# Patient Record
Sex: MALE | Race: WHITE | URBAN - METROPOLITAN AREA
[De-identification: names, ages, dates, MRNs, and addresses within clinical notes are randomized per-mention and may not be internally consistent; named-entity substitution may affect disease eponyms.]

---

## 2017-03-10 ENCOUNTER — OFFICE VISIT (OUTPATIENT)
Dept: FAMILY MEDICINE CLINIC | Age: 47
End: 2017-03-10

## 2017-03-10 VITALS
HEART RATE: 107 BPM | TEMPERATURE: 99.1 F | SYSTOLIC BLOOD PRESSURE: 111 MMHG | BODY MASS INDEX: 30.07 KG/M2 | OXYGEN SATURATION: 96 % | HEIGHT: 69 IN | DIASTOLIC BLOOD PRESSURE: 70 MMHG | WEIGHT: 203 LBS

## 2017-03-10 DIAGNOSIS — J18.9 CAP (COMMUNITY ACQUIRED PNEUMONIA): ICD-10-CM

## 2017-03-10 DIAGNOSIS — J31.0 RHINITIS, UNSPECIFIED TYPE: ICD-10-CM

## 2017-03-10 DIAGNOSIS — R06.02 SHORTNESS OF BREATH: Primary | ICD-10-CM

## 2017-03-10 RX ORDER — FLUTICASONE PROPIONATE 50 MCG
SPRAY, SUSPENSION (ML) NASAL
Qty: 1 BOTTLE | Refills: 1 | Status: SHIPPED | OUTPATIENT
Start: 2017-03-10

## 2017-03-10 RX ORDER — LORATADINE 10 MG/1
10 TABLET ORAL DAILY
Qty: 30 TAB | Refills: 1 | Status: SHIPPED | OUTPATIENT
Start: 2017-03-10

## 2017-03-10 RX ORDER — ALBUTEROL SULFATE 0.83 MG/ML
2.5 SOLUTION RESPIRATORY (INHALATION) ONCE
Qty: 1 EACH | Refills: 2
Start: 2017-03-10 | End: 2017-03-10

## 2017-03-10 RX ORDER — AZITHROMYCIN 250 MG/1
TABLET, FILM COATED ORAL
Qty: 6 TAB | Refills: 0 | Status: SHIPPED | OUTPATIENT
Start: 2017-03-10 | End: 2017-03-15

## 2017-03-10 NOTE — PROGRESS NOTES
Per provider order, the patient was given 3 albuterol Neb treatments. The first began at 10:36 and the HR was 98, irregular, and RA O2 sat 99%. Lung sounds diminished throughout all fields. The second dose began at 10:46 and the HR was 112, irregular, and RA O2 sat 98%. Lung sounds mildly improved in the upper lobes bilaterally. The third dose began at 10:55 and the HR was 115, irregular, and the RA O2 sat was 99%. The provider assessed the patient after the first and third doses. With his family member and [place] as our , we reviewed his AVS, 4 prescriptions, Good Rx coupon card, and the pharmacy location. They expressed understanding and will go now to registration to schedule a provider follow-up appointment for Monday, 03-13-17. They understand that the patient should seek emergency care if his condition worsens. We also discussed the importance of drinking plenty of fluids, rest, and changing positions regularly while resting as importance steps for his recovery.  Lucy Sifuentes RN

## 2017-03-10 NOTE — PATIENT INSTRUCTIONS
Neumonía: Instrucciones de cuidado - [ Pneumonia: Care Instructions ]  Instrucciones de cuidado    La neumonía es luther infección de los pulmones. Nancy Polo de los casos son causados por infecciones debidas a bacterias o virus. La neumonía puede ser leve o muy grave. Si es causada por bacterias, será tratado con antibióticos. La recuperación completa de la neumonía podría merlyn [employer] o algunos meses, dependiendo de qué tan enfermo estuvo y si bowers estado general de lanette es alfaro. La atención de seguimiento es luther parte clave de bowers tratamiento y seguridad. Asegúrese de hacer y acudir a todas las citas, y llame a bowers médico si está teniendo problemas. También es luther buena idea saber los resultados de los exámenes y mantener luther lista de los medicamentos que arcelia. ¿Cómo puede cuidarse en el hogar? · [address] indicaciones. No deje de tomarlos por el hecho de sentirse mejor. Debe merlyn todos los antibióticos hasta terminarlos. · [employer] medicamentos exactamente nicole le fueron recetados. Llame a bowers médico si navya estar teniendo problemas con bowers medicamento. · Descanse y duerma lo suficiente. Podría sentirse cansado y débil ronnie un [address], radha bowers nivel de energía mejorará con [place]. · Para prevenir la deshidratación, jose c abundantes líquidos, los suficientes para que bowers orina sea de color amarillo grecia o marian nicole el agua. Elija agua y otros líquidos renea sin cafeína hasta que se sienta mejor. Si tiene luther enfermedad del riñón, del corazón o del hígado y tiene que Kiran's líquidos, hable con bowers médico antes de aumentar bowers consumo. · Trate la tos para que pueda descansar. La tos con mucosidad (flema) de los pulmones es común con la neumonía. Es luther de las [employer] que bowers organismo [address].  Radha si la tos le impide descansar o le causa gran fatiga y dolor en la pared torácica, hable con bowers médico. Podría sugerirle que tome un medicamento para aliviar la tos. · Utilice un vaporizador o humidificador para añadir humedad en bowers habitación. Siga las indicaciones para limpiar el aparato. · No fume ni permita que otras personas fumen cerca de usted. Fumar hará que la tos dure [employer]. Si necesita ayuda para dejar de fumar, hable con bowers médico [employer] y medicamentos para dejar de fumar. Éstos pueden aumentar ki probabilidades de dejar el hábito para siempre. · Annex un analgésico (medicamento para el dolor) de venta ilvia, nicole acetaminofén (Tylenol), ibuprofeno (Advil, Motrin) o naproxeno (Aleve). Alam Rosa y siga todas las instrucciones de la [place]. · No tome dos o más analgésicos al mismo tiempo a menos que el médico se lo haya indicado. Muchos analgésicos contienen acetaminofén, es decir, Tylenol. El exceso de acetaminofén (Tylenol) puede ser dañino. · Si le dieron un espirómetro para medir qué tan esperanza están funcionando ki pulmones, utilícelo nicole se lo indicaron. Volta puede ayudar a bowers médico a saber cómo va bowers recuperación. · Para prevenir la neumonía en el futuro, hable con bowers médico acerca de vacunarse contra la gripe (luther vez al año) y Prachi Cocking antineumocócica (sólo luther vez para la [address]). ¿Cuándo debe pedir ayuda? Llame al 911 en cualquier momento que considere que necesita atención de emergencia. Por ejemplo, llame si:  · [address] dificultades para respirar. Llame a bowers médico ahora mismo o busque atención médica inmediata si:  · Tose mucosidad (esputo) de color café oscuro o con deborah. · Tiene nueva o peor dificultad para respirar. · Siente mareos o aturdimiento, o que está a punto de [place]. Preste especial atención a los cambios en bowers lanette y asegúrese de comunicarse con bowers médico si:  · Presenta fiebre o la fiebre es más pratima. · Bowers tos es más profunda o más frecuente que antes. · No está mejorando después de 2 días (48 horas). · No mejora nicole se esperaba.   ¿Dónde puede encontrar más información en inglés? Soumya Bae a [email or web address]-lobito.[email or web address] Codey Parra X463 en la búsqueda para aprender más acerca de \"Neumonía: Instrucciones de cuidado - [ Pneumonia: Care Instructions ]. \"  Revisado: [address] [place], 2016  Versión del contenido: 11.1  © 2852-7339 Healthwise, Incorporated. Las instrucciones de cuidado fueron adaptadas bajo licencia por Good [employer] (which disclaims liability or warranty for this information). Si usted tiene Geneva Higginson afección médica o sobre estas instrucciones, siempre pregunte a bowers profesional de lanette. Healthwise, Incorporated niega toda garantía o responsabilidad por bowers uso de esta información. Rinitis: Instrucciones de cuidado - [ Rhinitis: Care Instructions ]  Instrucciones de cuidado  La rinitis es luther hinchazón e irritación en la nariz. Con frecuencia, las alergias y las infecciones son la causa. Puede tener congestión o secreción nasal. Otros síntomas son la comezón y la irritación de ojos, oídos, garganta y boca. Si las alergias son la causa, es posible que el médico le loren pruebas para averiguar a qué es alérgico. Leandro vez pueda detener los síntomas si theron las cosas que los causan. El médico puede sugerir o recetar medicamentos para [employer]. La atención de seguimiento es luther parte clave de bowers tratamiento y seguridad. Asegúrese de hacer y acudir a todas las citas, y llame a bowers médico si está teniendo problemas. También es luther buena idea saber los resultados de los exámenes y mantener luther lista de los medicamentos que arcelia. ¿Cómo puede cuidarse en el hogar? · Si bowers rinitis es causada por alergias, trate de averiguar qué es lo que [employer]. [place] pasos para evitar los desencadenantes. ¨ Evite los trabajos [facility]. Estos pueden remover el polen y el moho. ¨ No fume ni permita que otros fumen cerca de usted.  Si necesita ayuda para dejar de fumar, hable con bowers médico sobre programas y medicamentos para dejar de fumar. Estos pueden aumentar ki probabilidades de dejar el hábito para siempre. ¨ No use aerosoles, productos de limpieza ni perfumes. [address] bowers casa y automóvil ronnie la época de floración si el polen es steven de los desencadenantes. ¨ Limpie bowers casa con frecuencia para controlar el polvo. ¨ Mantenga las [place] fuera de bowers casa. · Si bowers médico le recomienda un medicamento de venta livia para [employer] síntomas, tómelo exactamente nicole le fue recetado. Llame a bowers médico si navya estar teniendo problemas con bowers medicamento. · Use lavados nasales con solución salina (agua salada) para ayudar a mantener las fosas nasales despejadas y eliminar la mucosidad y las bacterias. Puede comprar gotas nasales de solución salina en un supermercado o luther farmacia. O puede prepararlas usted mismo en casa agregándole 1 cucharadita de sal y 1 cucharadita de bicarbonato de sodio a 2 tazas de agua destilada. Si las prepara usted mismo, llene luther tamika de goma con la solución, introduzca la punta en la fosa nasal y apriete con suavidad. Suénese la nariz. ¿Cuándo debe pedir ayuda? Llame a bowers médico ahora mismo o busque atención médica inmediata si:  · Tiene problemas para respirar. Preste especial atención a los cambios en bowers lanette y asegúrese de comunicarse con bowers médico si:  · La mucosidad de la nariz se vuelve más espesa (nicole pus) o contiene deborah. · Tiene síntomas nuevos o que empeoran. · No mejora nicole se esperaba. ¿Dónde puede encontrar más información en inglés? Rehan Labella a http://carmen-lobito.info/. [facility] C249 en la búsqueda para aprender más acerca de \"Rinitis: Instrucciones de cuidado - [ Rhinitis: Care Instructions ]. \"  Revisado: 29 julio, 2016  Versión del contenido: 11.1  © 1951-9884 [employer] [place].  Las instrucciones de cuidado fueron adaptadas bajo licencia por Good [employer] (which disclaims liability or warranty for this information). Si usted tiene [place] Ary afección médica o sobre estas instrucciones, siempre pregunte a bowers profesional de lanette. [place] niega toda garantía o responsabilidad por bowers uso de esta información.

## 2017-03-10 NOTE — PROGRESS NOTES
Assessment/Plan:    Vilma Zimmerman was seen today for cough. Diagnoses and all orders for this visit:    Shortness of breath  -     albuterol (PROVENTIL VENTOLIN) 2.5 mg /3 mL (0.083 %) nebulizer solution; 3 mL by Nebulization route once for 1 dose. May repeat up to three times  -     ALBUTEROL, INHAL. XVR.()    PT REFUSES ER eval. As such, I have told him that he must go to the ER if his sxs worsen or if he changes his mind. I suspect that he has CAP- fever, cough, myalgias but still wanted him to go to the ER for further eval. He states that he will if he feels that he needs to  Otherwise ask pt to come back in 3 days for recheck      Follow-up Disposition:  Return in about 3 days (around 3/13/2017), or if symptoms worsen or fail to improve. JEROD Lau expressed understanding of this plan. An AVS was printed and given to the patient.      ----------------------------------------------------------------------    Chief Complaint   Patient presents with    Cough     fever,nasal congestion       History of Present Illness:    New pt, arrived from [place] one week ago and then developed cough and fever. The sxs have persisted for 5 days now, without any decrease in the sxs. He is a non smoker and reports no chronic problems like DM or HTN. He is not on any medications except for OTC tylenol and some OTC cough meds. He last took tylenol at 4:30 am and 5 hours later his temp is 99.1. He reports that some of his family members also had a cough but no fever like he has been having, he also has had myalgias. He denies n/v/d. He has no hx of asthma  He flew on the plane with his family. He is here for a vacation. I saw many of his family members for much less severe sxs today. He states that there were lots of people coughing on the plane    No past medical history on file.     Current Outpatient Prescriptions   Medication Sig Dispense Refill    albuterol (PROVENTIL VENTOLIN) 2.5 mg /3 mL (0.083 %) nebulizer solution 3 mL by Nebulization route once for 1 dose. May repeat up to three times 1 Each 2    azithromycin (ZITHROMAX) 250 mg tablet Take 2 tablets today, then take 1 tablet daily 6 Tab 0    fluticasone (FLONASE) 50 mcg/actuation nasal spray Use 1 spray each nostril bid 1 Bottle 1    loratadine (CLARITIN) 10 mg tablet Take 1 Tab by mouth daily. 30 Tab 1       No Known Allergies    Social History   Substance Use Topics    Smoking status: Never Smoker    Smokeless tobacco: Not on file    Alcohol use No       No family history on file. Physical Exam:     Visit Vitals    /70 (BP 1 Location: Right arm, BP Patient Position: Sitting)    Pulse (!) 107    Temp 99.1 °F (37.3 °C) (Oral)    Ht 5' 9.25\" (1.759 m)    Wt 203 lb (92.1 kg)    SpO2 96%    BMI 29.76 kg/m2     Wearing mask, looks uncomfortable, coughing often  A&Ox3  WDWN NAD  Respirations normal and non labored  Lungs show decreased breath sounds alycia mid lungs  I listened to his lungs after 1st and 3rd nebs.  After each neb he states that he feels better, lung air movement is improved as well

## 2017-03-13 ENCOUNTER — OFFICE VISIT (OUTPATIENT)
Dept: FAMILY MEDICINE CLINIC | Age: 47
End: 2017-03-13

## 2017-03-13 VITALS
DIASTOLIC BLOOD PRESSURE: 76 MMHG | TEMPERATURE: 97.8 F | SYSTOLIC BLOOD PRESSURE: 109 MMHG | HEART RATE: 87 BPM | BODY MASS INDEX: 30.05 KG/M2 | OXYGEN SATURATION: 97 % | WEIGHT: 205 LBS

## 2017-03-13 DIAGNOSIS — J18.9 CAP (COMMUNITY ACQUIRED PNEUMONIA): Primary | ICD-10-CM

## 2017-03-13 NOTE — PROGRESS NOTES
Coordination of Care  1. Have you been to the ER, urgent care clinic since your last visit? Hospitalized since your last visit? No    2. Have you seen or consulted any other health care providers outside of the [address] [place] [address] since your last visit? Include any pap smears or colon screening. No    Medications  Medication Reconciliation Performed: no  Patient does not need refills     Learning Assessment Complete?  yes

## 2017-03-13 NOTE — PROGRESS NOTES
Assessment/Plan:    Here for a 3 day recheck on his respiratory sxs- I am treating him for CAP. He is much improved. sxs are resolving with current tx. Continue current tx and f/up PRN    Follow-up Disposition:  Return if symptoms worsen or fail to improve. JEROD Straussor Began expressed understanding of this plan. An AVS was printed and given to the patient.      ----------------------------------------------------------------------    Chief Complaint   Patient presents with    Follow-up     SOB    Dizziness     pt c/o dizziness since today. History of Present Illness:  Fever resolved after starting the abx. Cough is not completely resolved but much improved. Sleeping well now. Overall feels \"so much better\". Has 2 doses of azithromycin left. flonase has not been completely effective after 3 days- encouraged him to continue it. No past medical history on file. Current Outpatient Prescriptions   Medication Sig Dispense Refill    azithromycin (ZITHROMAX) 250 mg tablet Take 2 tablets today, then take 1 tablet daily 6 Tab 0    fluticasone (FLONASE) 50 mcg/actuation nasal spray Use 1 spray each nostril bid 1 Bottle 1    loratadine (CLARITIN) 10 mg tablet Take 1 Tab by mouth daily. 30 Tab 1       No Known Allergies    Social History   Substance Use Topics    Smoking status: Never Smoker    Smokeless tobacco: None    Alcohol use No       No family history on file.     Physical Exam:     Visit Vitals    /76 (BP 1 Location: Left arm, BP Patient Position: Sitting)    Pulse 87    Temp 97.8 °F (36.6 °C) (Oral)    Wt 205 lb (93 kg)    SpO2 97%    BMI 30.05 kg/m2     Pt looks so much more comfortable today, afebrile today, not wrapped up in too many clothes  A&Ox3  WDWN NAD  Respirations normal and non labored  Lungs are CTA alycia today